# Patient Record
Sex: FEMALE | Race: WHITE | NOT HISPANIC OR LATINO | Employment: OTHER | URBAN - METROPOLITAN AREA
[De-identification: names, ages, dates, MRNs, and addresses within clinical notes are randomized per-mention and may not be internally consistent; named-entity substitution may affect disease eponyms.]

---

## 2023-06-15 LAB
ALANINE AMINOTRANSFERASE (SGPT) (U/L) IN SER/PLAS: 18 U/L (ref 7–45)
ALBUMIN (G/DL) IN SER/PLAS: 4.3 G/DL (ref 3.4–5)
ALKALINE PHOSPHATASE (U/L) IN SER/PLAS: 35 U/L (ref 33–136)
ANION GAP IN SER/PLAS: 13 MMOL/L (ref 10–20)
ASPARTATE AMINOTRANSFERASE (SGOT) (U/L) IN SER/PLAS: 17 U/L (ref 9–39)
BILIRUBIN TOTAL (MG/DL) IN SER/PLAS: 0.7 MG/DL (ref 0–1.2)
CALCIUM (MG/DL) IN SER/PLAS: 9.7 MG/DL (ref 8.6–10.6)
CARBON DIOXIDE, TOTAL (MMOL/L) IN SER/PLAS: 30 MMOL/L (ref 21–32)
CHLORIDE (MMOL/L) IN SER/PLAS: 103 MMOL/L (ref 98–107)
CHOLESTEROL (MG/DL) IN SER/PLAS: 191 MG/DL (ref 0–199)
CHOLESTEROL IN HDL (MG/DL) IN SER/PLAS: 59.2 MG/DL
CHOLESTEROL/HDL RATIO: 3.2
CITRULLINE ANTIBODY, IGG: 2 U/ML
COBALAMIN (VITAMIN B12) (PG/ML) IN SER/PLAS: 889 PG/ML (ref 211–911)
CREATININE (MG/DL) IN SER/PLAS: 0.76 MG/DL (ref 0.5–1.05)
ERYTHROCYTE DISTRIBUTION WIDTH (RATIO) BY AUTOMATED COUNT: 13.4 % (ref 11.5–14.5)
ERYTHROCYTE MEAN CORPUSCULAR HEMOGLOBIN CONCENTRATION (G/DL) BY AUTOMATED: 31.8 G/DL (ref 32–36)
ERYTHROCYTE MEAN CORPUSCULAR VOLUME (FL) BY AUTOMATED COUNT: 96 FL (ref 80–100)
ERYTHROCYTES (10*6/UL) IN BLOOD BY AUTOMATED COUNT: 4.75 X10E12/L (ref 4–5.2)
GFR FEMALE: 78 ML/MIN/1.73M2
GLUCOSE (MG/DL) IN SER/PLAS: 91 MG/DL (ref 74–99)
HEMATOCRIT (%) IN BLOOD BY AUTOMATED COUNT: 45.6 % (ref 36–46)
HEMOGLOBIN (G/DL) IN BLOOD: 14.5 G/DL (ref 12–16)
LDL: 104 MG/DL (ref 0–99)
LEUKOCYTES (10*3/UL) IN BLOOD BY AUTOMATED COUNT: 6.9 X10E9/L (ref 4.4–11.3)
NRBC (PER 100 WBCS) BY AUTOMATED COUNT: 0 /100 WBC (ref 0–0)
PLATELETS (10*3/UL) IN BLOOD AUTOMATED COUNT: 242 X10E9/L (ref 150–450)
POTASSIUM (MMOL/L) IN SER/PLAS: 4.4 MMOL/L (ref 3.5–5.3)
PROTEIN TOTAL: 6.9 G/DL (ref 6.4–8.2)
RHEUMATOID FACTOR (IU/ML) IN SERUM OR PLASMA: <10 IU/ML (ref 0–15)
SODIUM (MMOL/L) IN SER/PLAS: 142 MMOL/L (ref 136–145)
THYROTROPIN (MIU/L) IN SER/PLAS BY DETECTION LIMIT <= 0.05 MIU/L: 3.06 MIU/L (ref 0.44–3.98)
TRIGLYCERIDE (MG/DL) IN SER/PLAS: 138 MG/DL (ref 0–149)
UREA NITROGEN (MG/DL) IN SER/PLAS: 17 MG/DL (ref 6–23)
VLDL: 28 MG/DL (ref 0–40)

## 2023-12-12 LAB
NON-UH HIE BUN/CREAT RATIO: 23.8
NON-UH HIE BUN: 19 MG/DL (ref 9–23)
NON-UH HIE CALCIUM: 9.6 MG/DL (ref 8.7–10.4)
NON-UH HIE CALCULATED OSMOLALITY: 282 MOSM/KG (ref 275–295)
NON-UH HIE CHLORIDE: 101 MMOL/L (ref 98–107)
NON-UH HIE CO2, VENOUS: 30 MMOL/L (ref 20–31)
NON-UH HIE CREATININE: 0.8 MG/DL (ref 0.5–0.8)
NON-UH HIE GFR AA: >60
NON-UH HIE GLOMERULAR FILTRATION RATE: >60 ML/MIN/1.73M?
NON-UH HIE GLUCOSE: 94 MG/DL (ref 74–106)
NON-UH HIE K: 3.9 MMOL/L (ref 3.5–5.1)
NON-UH HIE NA: 140 MMOL/L (ref 135–145)
NON-UH HIE VIT D 25: 20 NG/ML

## 2023-12-14 DIAGNOSIS — K21.9 GASTROESOPHAGEAL REFLUX DISEASE, UNSPECIFIED WHETHER ESOPHAGITIS PRESENT: Primary | ICD-10-CM

## 2023-12-14 RX ORDER — OMEPRAZOLE 40 MG/1
CAPSULE, DELAYED RELEASE ORAL
Qty: 39 CAPSULE | Refills: 0 | Status: SHIPPED | OUTPATIENT
Start: 2023-12-14

## 2023-12-19 PROBLEM — E78.5 HYPERLIPIDEMIA: Status: ACTIVE | Noted: 2023-12-19

## 2023-12-19 PROBLEM — M85.80 OSTEOPENIA: Status: ACTIVE | Noted: 2023-12-19

## 2023-12-19 PROBLEM — E55.9 VITAMIN D DEFICIENCY: Status: ACTIVE | Noted: 2023-12-19

## 2023-12-19 PROBLEM — K21.9 GERD (GASTROESOPHAGEAL REFLUX DISEASE): Status: ACTIVE | Noted: 2023-12-19

## 2023-12-19 PROBLEM — R19.7 DIARRHEA: Status: ACTIVE | Noted: 2023-12-19

## 2023-12-19 PROBLEM — N81.10 CYSTOCELE, UNSPECIFIED: Status: ACTIVE | Noted: 2023-12-19

## 2023-12-19 PROBLEM — N81.6 RECTOCELE, FEMALE: Status: ACTIVE | Noted: 2023-12-19

## 2023-12-19 PROBLEM — Z96.659 HISTORY OF KNEE REPLACEMENT: Status: ACTIVE | Noted: 2023-12-19

## 2023-12-19 PROBLEM — K44.9 HIATAL HERNIA: Status: ACTIVE | Noted: 2023-12-19

## 2023-12-19 PROBLEM — K63.5 COLON POLYP: Status: ACTIVE | Noted: 2023-12-19

## 2023-12-19 PROBLEM — M19.90 ARTHRITIS: Status: ACTIVE | Noted: 2023-12-19

## 2023-12-19 PROBLEM — R79.89 LOW VITAMIN B12 LEVEL: Status: ACTIVE | Noted: 2023-12-19

## 2023-12-19 PROBLEM — I10 HYPERTENSION: Status: ACTIVE | Noted: 2023-12-19

## 2023-12-19 PROBLEM — Z98.890 HISTORY OF BACK SURGERY: Status: ACTIVE | Noted: 2023-12-19

## 2023-12-19 PROBLEM — K58.9 IBS (IRRITABLE BOWEL SYNDROME): Status: ACTIVE | Noted: 2023-12-19

## 2023-12-19 PROBLEM — E03.9 HYPOTHYROIDISM: Status: ACTIVE | Noted: 2023-12-19

## 2023-12-19 NOTE — PROGRESS NOTES
"Chief Complaint   Patient presents with    Follow-up     Pt here for 6 mo FUV     83 year old woman last visit 6/2023  At last visit complained of unsteadiness. Referred to neuro and PT.  She saw a doctor- Dr Garcia.  Had scan brain told ok per patient.   She indicates would like to see a cardiologist.  Cannot tell anyone why she fell. Just happens.   Switching doctors. Needs someone closer to her home.   has back problems. He is very bent over.    PAST MEDICAL HISTORY:   1. Hypertension. Lexiscan scan 2015 negative. EF 74%.  2. Hyperlipidemia.  3. Hypothyroidism. TSH 6/2023 normal  4. Cystocele rectocele surgery, Dr. Segundo now Dr Keen (had botox injection)  5. Prior shingles.  6. Chronic right leg and right shoulder pain.  7. Chronic venous insufficiency.  8. Colon polyp 2020, Dr. Chavez.  9. Lactose intolerance.  10. Chronic diarrhea, Dr. Chavez.  11. History of spine surgery with hardware Dr. Payne  surgery with screws placed 2018   Dr. Lomas. Dr Gold had injection  12. History of right knee replacement, Dr. Durand at [facility]  13. Left knee replacement, Dr. Marie, [facility].  14. Hysterectomy        SOCIAL HISTORY: . Nonsmoker. Has children and grandchildren.  3 great granddaughter     FAMILY HISTORY: Daughter has breast cancer.      PHYSICAL EXAMINATION:   Blood pressure 108/68, pulse 56, temperature 36.7 °C (98 °F), height 1.651 m (5' 5\"), weight 83.9 kg (185 lb), SpO2 97 %.  Body mass index is 30.79 kg/m².  Cane. Glasses.    Vital reviewed  Neck: no cervical/clavicular adenopathy  CV: RRR S1 S2 normal. No murmur  Lungs: CTA without wrr. Breath sounds symmetric  Abdomen: normoactive. Soft, nontender. No mass.  Extremities: no pretibial edema  Neuro: speech intact.        LABS  12/2023 bmp, D   Cr 0.8 glucose 94 D low 20    06/2023 cbc, rheum factor, cmp, lipid, tsh, b12, ccp  wbc 6.9 hg 14.5 platelet 242 rheumatoid factor negative  Cr 0.76 glucose 91 lfts " negative  191/59/104/138  b12 889 tsh 3.06 ccp 2     12/2022 cmp, cbc  Na 143 K low 3.4 cr 0.7 glucose 88 lfts negative  wbc 5.5 hg 13.5 hct 42.7 platelet 237     6/2022 cbc, tsh, cmp, lipid, d  wbc 5.8 hg 13.3 hct 38.9 platelet 230 TSH 2.18 normal  Cr 0.8 K 3.9 glucose 98 lfts negative D 29 low  154/41/89/119     6/2021 cbc tsh, cmp, lipid, D  wbc 5.8 hg 13.3 hct 38.9 platelet 230  tsh 2.18 normal  Cr 0.8 K 3.9 glucose 98 lfts ngative  154/41/89/119  D low 29          ASSESSMENT AND PLAN  Vitamin D deficiency: rx vitamin D sent  Falls, htn, referral to cardiologist as per patient request. Unable to review Dr Garcia's note or Ct report  Osteopenia advised dexa end of next year.   HTN well controlled.  Hyperlipidemia discussed lipid due next summer.   Prevnar 20 today.   Mammogram 6/2023  Dexa 12/2021 T -2.1  Hysterectomy  colonoscopy 01/2021  recommend tdap and shingles vaccine at pharmacy  prevnar due end of the year. Ordered.

## 2023-12-20 ENCOUNTER — OFFICE VISIT (OUTPATIENT)
Dept: PRIMARY CARE | Facility: CLINIC | Age: 83
End: 2023-12-20
Payer: MEDICARE

## 2023-12-20 VITALS
HEART RATE: 56 BPM | DIASTOLIC BLOOD PRESSURE: 68 MMHG | SYSTOLIC BLOOD PRESSURE: 128 MMHG | TEMPERATURE: 98 F | HEIGHT: 65 IN | WEIGHT: 185 LBS | OXYGEN SATURATION: 97 % | BODY MASS INDEX: 30.82 KG/M2
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
## 2023-12-20 DIAGNOSIS — E55.9 VITAMIN D DEFICIENCY: ICD-10-CM

## 2023-12-20 PROCEDURE — 1126F AMNT PAIN NOTED NONE PRSNT: CPT | Performed by: INTERNAL MEDICINE

## 2023-12-20 PROCEDURE — 1160F RVW MEDS BY RX/DR IN RCRD: CPT | Performed by: INTERNAL MEDICINE

## 2023-12-20 PROCEDURE — 1159F MED LIST DOCD IN RCRD: CPT | Performed by: INTERNAL MEDICINE

## 2023-12-20 PROCEDURE — 90677 PCV20 VACCINE IM: CPT | Performed by: INTERNAL MEDICINE

## 2023-12-20 PROCEDURE — 3074F SYST BP LT 130 MM HG: CPT | Performed by: INTERNAL MEDICINE

## 2023-12-20 PROCEDURE — 1036F TOBACCO NON-USER: CPT | Performed by: INTERNAL MEDICINE

## 2023-12-20 PROCEDURE — G0009 ADMIN PNEUMOCOCCAL VACCINE: HCPCS | Performed by: INTERNAL MEDICINE

## 2023-12-20 PROCEDURE — 3078F DIAST BP <80 MM HG: CPT | Performed by: INTERNAL MEDICINE

## 2023-12-20 PROCEDURE — 99214 OFFICE O/P EST MOD 30 MIN: CPT | Performed by: INTERNAL MEDICINE

## 2023-12-20 RX ORDER — DICYCLOMINE HYDROCHLORIDE 10 MG/1
10 CAPSULE ORAL 2 TIMES DAILY
COMMUNITY
Start: 2014-03-12

## 2023-12-20 RX ORDER — TIZANIDINE 2 MG/1
2 TABLET ORAL NIGHTLY PRN
COMMUNITY
Start: 2022-10-16

## 2023-12-20 RX ORDER — DICLOFENAC SODIUM 10 MG/G
4 GEL TOPICAL 4 TIMES DAILY PRN
COMMUNITY

## 2023-12-20 RX ORDER — LANOLIN ALCOHOL/MO/W.PET/CERES
1 CREAM (GRAM) TOPICAL DAILY
COMMUNITY

## 2023-12-20 RX ORDER — OXYBUTYNIN CHLORIDE 15 MG/1
15 TABLET, EXTENDED RELEASE ORAL DAILY
COMMUNITY
Start: 2023-11-15

## 2023-12-20 RX ORDER — CHOLECALCIFEROL (VITAMIN D3) 1250 MCG
TABLET ORAL
Qty: 8 TABLET | Refills: 0 | Status: SHIPPED | OUTPATIENT
Start: 2023-12-20

## 2023-12-20 RX ORDER — VIT C/E/ZN/COPPR/LUTEIN/ZEAXAN 250MG-90MG
2 CAPSULE ORAL DAILY
COMMUNITY

## 2023-12-20 RX ORDER — FENOFIBRATE 160 MG/1
160 TABLET ORAL DAILY
COMMUNITY

## 2023-12-20 RX ORDER — PREGABALIN 150 MG/1
150 CAPSULE ORAL
COMMUNITY
Start: 2023-12-06 | End: 2024-06-03

## 2023-12-20 ASSESSMENT — PATIENT HEALTH QUESTIONNAIRE - PHQ9
1. LITTLE INTEREST OR PLEASURE IN DOING THINGS: NOT AT ALL
2. FEELING DOWN, DEPRESSED OR HOPELESS: NOT AT ALL
SUM OF ALL RESPONSES TO PHQ9 QUESTIONS 1 & 2: 0

## 2023-12-20 ASSESSMENT — PAIN SCALES - GENERAL: PAINLEVEL: 0-NO PAIN

## 2024-07-09 ENCOUNTER — PATIENT OUTREACH (OUTPATIENT)
Dept: CARE COORDINATION | Facility: CLINIC | Age: 84
End: 2024-07-09
Payer: MEDICARE

## 2024-07-09 NOTE — PROGRESS NOTES
Date: 07/09/24    Dear Kathryn Estrada    Our records indicate that you are due for the following appointment or test: Annual Wellness Visit      Please call our office at your earliest convenience. We can assist you with scheduling an appointment or test.  If you have already scheduled an appointment or have completed testing, please disregard this letter.    Sincerely,    Monie Hayes    [facility]  [address] [place] [address]    Office Phone: [phone]  Patient Navigator: [phone]

## 2024-07-09 NOTE — PROGRESS NOTES
Date: 07/09/24    Dear Kathryn Estrada    Our records indicate that you are due for the following appointment or test: Annual Wellness Visit    Patient is no longer seeing Dr. Antonio for her Primary Care. I updated for new Primary Care Provider in her chart.     Sincerely,    Monie Hayes    [facility]  [address] [place] [address]    Office Phone: [phone]  Patient Navigator: [phone]